# Patient Record
Sex: MALE | Race: WHITE | NOT HISPANIC OR LATINO | Employment: FULL TIME | URBAN - METROPOLITAN AREA
[De-identification: names, ages, dates, MRNs, and addresses within clinical notes are randomized per-mention and may not be internally consistent; named-entity substitution may affect disease eponyms.]

---

## 2018-08-28 ENCOUNTER — TELEPHONE (OUTPATIENT)
Dept: UROLOGY | Facility: CLINIC | Age: 48
End: 2018-08-28

## 2018-11-19 ENCOUNTER — OFFICE VISIT (OUTPATIENT)
Dept: PRIMARY CARE CLINIC | Facility: CLINIC | Age: 48
End: 2018-11-19
Payer: COMMERCIAL

## 2018-11-19 VITALS
HEART RATE: 55 BPM | WEIGHT: 250 LBS | OXYGEN SATURATION: 97 % | HEIGHT: 77 IN | DIASTOLIC BLOOD PRESSURE: 60 MMHG | SYSTOLIC BLOOD PRESSURE: 107 MMHG | BODY MASS INDEX: 29.52 KG/M2 | RESPIRATION RATE: 16 BRPM | TEMPERATURE: 98 F

## 2018-11-19 DIAGNOSIS — F41.9 ANXIETY: ICD-10-CM

## 2018-11-19 DIAGNOSIS — M67.431 GANGLION CYST OF DORSUM OF RIGHT WRIST: ICD-10-CM

## 2018-11-19 DIAGNOSIS — E11.69 TYPE 2 DIABETES MELLITUS WITH HYPERLIPIDEMIA: ICD-10-CM

## 2018-11-19 DIAGNOSIS — Z23 NEED FOR VACCINATION: ICD-10-CM

## 2018-11-19 DIAGNOSIS — G47.33 OSA (OBSTRUCTIVE SLEEP APNEA): ICD-10-CM

## 2018-11-19 DIAGNOSIS — I10 ESSENTIAL HYPERTENSION, BENIGN: ICD-10-CM

## 2018-11-19 DIAGNOSIS — F43.10 PTSD (POST-TRAUMATIC STRESS DISORDER): ICD-10-CM

## 2018-11-19 DIAGNOSIS — Z00.00 ANNUAL PHYSICAL EXAM: Primary | ICD-10-CM

## 2018-11-19 DIAGNOSIS — E78.2 MIXED HYPERLIPIDEMIA: ICD-10-CM

## 2018-11-19 DIAGNOSIS — E78.5 TYPE 2 DIABETES MELLITUS WITH HYPERLIPIDEMIA: ICD-10-CM

## 2018-11-19 DIAGNOSIS — F32.A DEPRESSION, UNSPECIFIED DEPRESSION TYPE: ICD-10-CM

## 2018-11-19 PROCEDURE — 99396 PREV VISIT EST AGE 40-64: CPT | Mod: 25,S$GLB,, | Performed by: FAMILY MEDICINE

## 2018-11-19 PROCEDURE — 90471 IMMUNIZATION ADMIN: CPT | Mod: S$GLB,,, | Performed by: FAMILY MEDICINE

## 2018-11-19 PROCEDURE — 90732 PPSV23 VACC 2 YRS+ SUBQ/IM: CPT | Mod: S$GLB,,, | Performed by: FAMILY MEDICINE

## 2018-11-19 PROCEDURE — 99999 PR PBB SHADOW E&M-EST. PATIENT-LVL III: CPT | Mod: PBBFAC,,, | Performed by: FAMILY MEDICINE

## 2018-11-19 RX ORDER — VENLAFAXINE HYDROCHLORIDE 75 MG/1
75 CAPSULE, EXTENDED RELEASE ORAL DAILY
COMMUNITY

## 2018-11-19 RX ORDER — ATORVASTATIN CALCIUM 80 MG/1
40 TABLET, FILM COATED ORAL DAILY
COMMUNITY

## 2018-11-19 RX ORDER — VENLAFAXINE 75 MG/1
75 TABLET ORAL NIGHTLY
COMMUNITY
End: 2018-11-19 | Stop reason: CLARIF

## 2018-11-19 NOTE — PROGRESS NOTES
Patient ID by name and . NKDA. Pneumonia 23 vaccine given IM in left deltoid using aseptic technique. Aspirated with no blood noted. Patient tolerated well. Given per physicians order. No adverse reaction noted.

## 2018-11-19 NOTE — PROGRESS NOTES
"Subjective:       Patient ID: Jarett Lnage Jr. is a 47 y.o. male.    Chief Complaint: Annual Exam (Patient is fasting for labs )    Here for annual exam, fasting for labs.  Checks his sugar periodically, typically in the low 100s.  No recent hypoglycemia.  Also sees a primary care physician at the [facility]  Sees a mental health provider there, as well.  Recently switched from Wellbutrin to Effexor, seems to be doing much better.      Review of Systems   Constitutional: Negative for chills, fatigue and fever.   HENT: Negative for congestion.    Eyes: Negative for visual disturbance.   Respiratory: Negative for cough and shortness of breath.    Cardiovascular: Negative for chest pain.   Gastrointestinal: Negative for abdominal pain, nausea and vomiting.   Endocrine: Negative for polydipsia and polyuria.   Genitourinary: Negative for difficulty urinating.   Musculoskeletal: Negative for arthralgias.   Skin: Negative for rash.   Allergic/Immunologic: Negative for immunocompromised state.   Neurological: Negative for dizziness.   Hematological: Does not bruise/bleed easily.   Psychiatric/Behavioral: Positive for dysphoric mood and sleep disturbance. Negative for self-injury and suicidal ideas. The patient is nervous/anxious.        Objective:      Vitals:    11/19/18 1619   BP: 107/60   BP Location: Left arm   Patient Position: Sitting   BP Method: Large (Automatic)   Pulse: (!) 55   Resp: 16   Temp: 98.3 °F (36.8 °C)   TempSrc: Oral   SpO2: 97%   Weight: 113.4 kg (250 lb)   Height: 6' 5" (1.956 m)     Physical Exam   Constitutional: He is oriented to person, place, and time. He appears well-developed and well-nourished.   HENT:   Head: Normocephalic and atraumatic.   Mouth/Throat: Oropharynx is clear and moist.   Eyes: EOM are normal. Pupils are equal, round, and reactive to light.   Neck: Neck supple. No JVD present. Carotid bruit is not present.   Cardiovascular: Normal rate, regular rhythm and normal heart " sounds.   Pulses:       Radial pulses are 2+ on the right side, and 2+ on the left side.        Dorsalis pedis pulses are 1+ on the right side, and 1+ on the left side.   Pulmonary/Chest: Effort normal and breath sounds normal.   Abdominal: Soft. Bowel sounds are normal. There is no tenderness.   Musculoskeletal: He exhibits no edema.        Right wrist: He exhibits swelling (1 cm cystic mass to the dorsal surface of the right wrist, nontender).   Feet:   Right Foot:   Protective Sensation: 9 sites tested. 9 sites sensed.   Skin Integrity: Negative for ulcer, blister or skin breakdown.   Left Foot:   Protective Sensation: 9 sites tested. 9 sites sensed.   Skin Integrity: Negative for ulcer, blister or skin breakdown.   Neurological: He is alert and oriented to person, place, and time.   Skin: Skin is warm and dry.   Psychiatric: He has a normal mood and affect. His behavior is normal.   Nursing note and vitals reviewed.      Assessment:       1. Annual physical exam    2. Mixed hyperlipidemia    3. Type 2 diabetes mellitus with hyperlipidemia    4. Essential hypertension, benign    5. PTSD (post-traumatic stress disorder)    6. FREIDA (obstructive sleep apnea)    7. Need for vaccination    8. Ganglion cyst of dorsum of right wrist    9. Anxiety    10. Depression, unspecified depression type        Plan:       Annual physical exam  -     CBC auto differential; Future; Expected date: 11/19/2018  -     Comprehensive metabolic panel; Future; Expected date: 11/19/2018  -     Lipid panel; Future; Expected date: 11/19/2018  -     Hemoglobin A1c; Future; Expected date: 11/19/2018  -     Microalbumin/creatinine urine ratio; Future; Expected date: 11/19/2018  -     TSH; Future; Expected date: 11/19/2018    Mixed hyperlipidemia  -     Comprehensive metabolic panel; Future; Expected date: 11/19/2018  -     Lipid panel; Future; Expected date: 11/19/2018  -     TSH; Future; Expected date: 11/19/2018    Type 2 diabetes mellitus with  hyperlipidemia  -     Comprehensive metabolic panel; Future; Expected date: 11/19/2018  -     Hemoglobin A1c; Future; Expected date: 11/19/2018  -     Microalbumin/creatinine urine ratio; Future; Expected date: 11/19/2018  -     TSH; Future; Expected date: 11/19/2018  -     HM DIABETES FOOT EXAM    Essential hypertension, benign  -     CBC auto differential; Future; Expected date: 11/19/2018  -     Comprehensive metabolic panel; Future; Expected date: 11/19/2018    PTSD (post-traumatic stress disorder)  -     TSH; Future; Expected date: 11/19/2018    FREIDA (obstructive sleep apnea)    Need for vaccination  -     Pneumococcal Polysaccharide Vaccine (23 Valent) (SQ/IM)    Ganglion cyst of dorsum of right wrist  -     Ambulatory Referral to Orthopedics    Anxiety    Depression, unspecified depression type         Medication List           Accurate as of 11/19/18  6:10 PM. If you have any questions, ask your nurse or doctor.               CONTINUE taking these medications    atorvastatin 80 MG tablet  Commonly known as:  LIPITOR     lisinopril 20 MG tablet  Commonly known as:  PRINIVIL,ZESTRIL     LORazepam 2 MG Tab  Commonly known as:  ATIVAN     metFORMIN 1000 MG tablet  Commonly known as:  GLUCOPHAGE     venlafaxine 75 MG 24 hr capsule  Commonly known as:  EFFEXOR-XR        STOP taking these medications    diphenhydrAMINE 25 mg capsule  Commonly known as:  BENADRYL  Stopped by:  Omar Contreras MD     gabapentin 300 MG capsule  Commonly known as:  NEURONTIN  Stopped by:  Omar Contreras MD     mirtazapine 30 MG tablet  Commonly known as:  REMERON  Stopped by:  Omar Contreras MD

## 2018-11-21 ENCOUNTER — TELEPHONE (OUTPATIENT)
Dept: PRIMARY CARE CLINIC | Facility: CLINIC | Age: 48
End: 2018-11-21

## 2018-11-21 DIAGNOSIS — E87.6 HYPOKALEMIA: Primary | ICD-10-CM

## 2018-11-21 NOTE — TELEPHONE ENCOUNTER
Spoke w/ patient. Patient notified of results. Advised patient to continue current medications and add OTC Fish Oil 2,000mg daily, increase in-take of potassium rich foods with examples, and scheduled for repeat BMP and EKG. Patient verbalized understanding and call was ended.             ----- Message from Zoraida Dee sent at 11/21/2018 11:30 AM CST -----  Contact: self  Type:  Patient Returning Call    Who Called:  sefl  Who Left Message for Patient:  Sudhir  Does the patient know what this is regarding?:  no  Best Call Back Number:  [phone]  Additional Information:  Patient missed call. Please call back. Thanks!

## 2018-12-11 ENCOUNTER — OFFICE VISIT (OUTPATIENT)
Dept: PRIMARY CARE CLINIC | Facility: CLINIC | Age: 48
End: 2018-12-11
Payer: COMMERCIAL

## 2018-12-11 VITALS
SYSTOLIC BLOOD PRESSURE: 112 MMHG | HEART RATE: 72 BPM | RESPIRATION RATE: 16 BRPM | BODY MASS INDEX: 29.28 KG/M2 | HEIGHT: 77 IN | WEIGHT: 248 LBS | DIASTOLIC BLOOD PRESSURE: 66 MMHG | OXYGEN SATURATION: 99 % | TEMPERATURE: 98 F

## 2018-12-11 DIAGNOSIS — E78.2 MIXED HYPERLIPIDEMIA: Primary | ICD-10-CM

## 2018-12-11 DIAGNOSIS — E78.5 TYPE 2 DIABETES MELLITUS WITH HYPERLIPIDEMIA: ICD-10-CM

## 2018-12-11 DIAGNOSIS — E87.6 HYPOKALEMIA: ICD-10-CM

## 2018-12-11 DIAGNOSIS — E11.69 TYPE 2 DIABETES MELLITUS WITH HYPERLIPIDEMIA: ICD-10-CM

## 2018-12-11 PROCEDURE — 93005 ELECTROCARDIOGRAM TRACING: CPT | Mod: S$GLB,,, | Performed by: FAMILY MEDICINE

## 2018-12-11 PROCEDURE — 99999 PR PBB SHADOW E&M-EST. PATIENT-LVL III: CPT | Mod: PBBFAC,,, | Performed by: FAMILY MEDICINE

## 2018-12-11 PROCEDURE — 3008F BODY MASS INDEX DOCD: CPT | Mod: CPTII,S$GLB,, | Performed by: FAMILY MEDICINE

## 2018-12-11 PROCEDURE — 99214 OFFICE O/P EST MOD 30 MIN: CPT | Mod: S$GLB,,, | Performed by: FAMILY MEDICINE

## 2018-12-11 PROCEDURE — 93010 ELECTROCARDIOGRAM REPORT: CPT | Mod: S$GLB,,, | Performed by: INTERNAL MEDICINE

## 2018-12-11 PROCEDURE — 3044F HG A1C LEVEL LT 7.0%: CPT | Mod: CPTII,S$GLB,, | Performed by: FAMILY MEDICINE

## 2018-12-11 NOTE — PROGRESS NOTES
"Subjective:       Patient ID: Jarett Lange Jr. is a 48 y.o. male.    Chief Complaint: Follow-up (Patient is here to review lab results )    Diabetes mellitus - a1c 5.7, compliant with meds, no hypoglycemia  HLD - TG >300, cholesterol level looks good. Recently started taking OTC fish oil, tolerating well  HTN - compliant with meds, BP well controlled  K low on recent labs, says it has been low in the past. No recent illness, no N/V/D        Review of Systems   Constitutional: Negative for fever.   Eyes: Negative for visual disturbance.   Respiratory: Negative for shortness of breath.    Cardiovascular: Negative for chest pain.   Gastrointestinal: Negative for diarrhea, nausea and vomiting.   Genitourinary: Negative for difficulty urinating.   Skin: Negative for rash.   Psychiatric/Behavioral: Negative for agitation. The patient is nervous/anxious.        Objective:      Vitals:    12/11/18 1334   BP: 112/66   BP Location: Left arm   Patient Position: Sitting   BP Method: Large (Automatic)   Pulse: 72   Resp: 16   Temp: 98.1 °F (36.7 °C)   TempSrc: Oral   SpO2: 99%   Weight: 112.5 kg (248 lb)   Height: 6' 5" (1.956 m)     Lab Results   Component Value Date    WBC 8.20 11/19/2018    HGB 13.7 (L) 11/19/2018    HCT 39.8 (L) 11/19/2018     11/19/2018    CHOL 144 11/19/2018    TRIG 326 (H) 11/19/2018    HDL 44 11/19/2018    ALT 27 11/19/2018    AST 23 11/19/2018     11/19/2018    K 3.2 (L) 11/19/2018     (L) 11/19/2018    CREATININE 1.0 11/19/2018    BUN 8 11/19/2018    CO2 30 (H) 11/19/2018    TSH 1.47 11/19/2018    HGBA1C 5.7 (H) 11/19/2018     Physical Exam   Constitutional: He is oriented to person, place, and time. He appears well-developed and well-nourished.   HENT:   Head: Normocephalic and atraumatic.   Neck: No JVD present.   Cardiovascular: Normal rate, regular rhythm and normal heart sounds.   Pulmonary/Chest: Effort normal and breath sounds normal.   Musculoskeletal: He exhibits no " edema.   Neurological: He is alert and oriented to person, place, and time.   Skin: Skin is warm and dry.   Psychiatric: He has a normal mood and affect. His behavior is normal.   Nursing note and vitals reviewed.      Assessment:       1. Mixed hyperlipidemia    2. Type 2 diabetes mellitus with hyperlipidemia    3. Hypokalemia        Plan:       Mixed hyperlipidemia  -     Comprehensive metabolic panel; Future; Expected date: 03/11/2019  -     Lipid panel; Future; Expected date: 03/11/2019  Continue current regimen (atorvastatin and fish oil). Repeat fasting lipid profile in 3 months. If still elevated, switch to rosuvastatin.  Type 2 diabetes mellitus with hyperlipidemia  Well controlled, continue current meds  Hypokalemia  -     EKG 12-lead  No acute EKG changes.  BMP today       Medication List           Accurate as of 12/11/18  2:37 PM. If you have any questions, ask your nurse or doctor.               CONTINUE taking these medications    atorvastatin 80 MG tablet  Commonly known as:  LIPITOR     FISH OIL ORAL     lisinopril 20 MG tablet  Commonly known as:  PRINIVIL,ZESTRIL     LORazepam 2 MG Tab  Commonly known as:  ATIVAN     metFORMIN 1000 MG tablet  Commonly known as:  GLUCOPHAGE     venlafaxine 75 MG 24 hr capsule  Commonly known as:  EFFEXOR-XR

## 2019-10-31 DIAGNOSIS — E11.9 TYPE 2 DIABETES MELLITUS WITHOUT COMPLICATION: ICD-10-CM

## 2020-05-21 DIAGNOSIS — E11.69 TYPE 2 DIABETES MELLITUS WITH HYPERLIPIDEMIA: ICD-10-CM

## 2020-05-21 DIAGNOSIS — E78.5 TYPE 2 DIABETES MELLITUS WITH HYPERLIPIDEMIA: ICD-10-CM

## 2025-08-01 ENCOUNTER — OFFICE VISIT (OUTPATIENT)
Dept: OTOLARYNGOLOGY | Facility: CLINIC | Age: 55
End: 2025-08-01
Payer: COMMERCIAL

## 2025-08-01 VITALS
SYSTOLIC BLOOD PRESSURE: 156 MMHG | HEART RATE: 48 BPM | BODY MASS INDEX: 29.6 KG/M2 | HEIGHT: 77 IN | DIASTOLIC BLOOD PRESSURE: 83 MMHG | WEIGHT: 250.69 LBS

## 2025-08-01 DIAGNOSIS — H74.12 ADHESIVE MIDDLE EAR DISEASE WITH ADHESIONS OF DRUM HEAD TO INCUS, LEFT: ICD-10-CM

## 2025-08-01 DIAGNOSIS — H69.93 CHRONIC EUSTACHIAN TUBE DYSFUNCTION, BILATERAL: ICD-10-CM

## 2025-08-01 DIAGNOSIS — H81.20 ACUTE PERIPHERAL VESTIBULOPATHY, UNSPECIFIED LATERALITY: Primary | ICD-10-CM

## 2025-08-01 DIAGNOSIS — H61.22 IMPACTED CERUMEN OF LEFT EAR: ICD-10-CM

## 2025-08-01 DIAGNOSIS — R42 VERTIGO: ICD-10-CM

## 2025-08-01 PROCEDURE — 99999 PR PBB SHADOW E&M-EST. PATIENT-LVL V: CPT | Mod: PBBFAC,,, | Performed by: OTOLARYNGOLOGY

## 2025-08-01 RX ORDER — FLUOXETINE 20 MG/1
60 CAPSULE ORAL DAILY
COMMUNITY
Start: 2025-07-23

## 2025-08-01 RX ORDER — LORAZEPAM 1 MG/1
1 TABLET ORAL DAILY PRN
COMMUNITY
Start: 2025-07-23

## 2025-08-01 RX ORDER — LISINOPRIL 40 MG/1
40 TABLET ORAL
COMMUNITY
Start: 2025-01-07

## 2025-08-01 NOTE — PATIENT INSTRUCTIONS
Vestibular rehabilitation therapy as ordered.  Home exercises until directed other home exercises by a vestibular trained therapist.  VNG testing and audiogram at [facility].  No meclizine or any other sedating medications for least 24 hours prior to this test.  Follow up here in [place] after completion of testing.      As discussed if there is any worsening of symptoms or new symptoms emergent evaluation in the ER is appropriate.    Voice recognition software was used in the creation of this note/communication and any sound-alike errors which may have occurred from its use should be taken in context when interpreting.  If such errors prevent a clear understanding of the note/communication, please contact the office for clarification.      Cawthorne-Cooksey exercises    Instructions for patients    The balance parts of the two ears complement each other by sending equal   impulses to the brain which are essential for the maintenance of equilibrium of the head and body.    If either or both balance centres are damaged, equilibrium is upset. The result of this is vertigo or giddiness, which may be accompanied by nausea and vomiting.     Although this condition may be very frightening it is not serious, in that it does not, in itself, threaten life. It can, in many cases, be overcome by carrying out these special exercises.    The purpose of these exercises is to build up a tolerance mechanism and the more diligently and regularly they are carried out, the sooner the symptoms will disappear.    The exercises should be performed three times each day.    Begin with exercise 1A. This should be performed in three sets of five, three times a day. Grade the severity of your symptoms as you do this exercise, using the following scale:  0 symptom free  1 mild discomfort  2 discomfort  3 severe    Only when the symptoms clear, or after two weeks, move on to the next exercise     1B.  A conscious effort should be  made to seek out the head positions and movement that cause vertigo as far as can be tolerated, because the more frequently vertigo is induced the more quickly the brain compensation  mechanism builds up.    You may have been prescribed medication to ease your symptoms and should continue with these while on this exercise program.    Exercises    A In bed  1 Eye movements: at first slow, then quick  a) up and down  b) from side to side  c) focus on finger moving from 3ft to 1ft away from face  2 Head movements: at first slow, then quick. Later with eyes closed.  a) bend backwards and forwards  b) turn from side to side    B Sitting  1 and 2 - as above  3 Shoulder shrugging and circling  4 Bend forward and  objects from the ground  A Standing  1 As A1, A2 and B3  2 Change from sitting to standing with eyes open, then eyes closed  3 Throw a small ball from hand to hand (above eye level)  4 Throw a ball from hand to hand under the knee  5 Change from sitting to standing and turn round in between

## 2025-08-01 NOTE — PROCEDURES
EAR DEBRIDEMENT / CERUMEN DISIMPACTION, 81915     Indication: Excessive cerumen with symptoms, limiting complete ear exam    Side: bilateral    Findings: See physical exam    Procedure: Ear canal(s) cleared completely using suction with microscopy.  Wax was not hydrolyzed with peroxide.  Topical medication was not applied.    Complications: none

## 2025-08-01 NOTE — PROGRESS NOTES
[facility]    Subjective:      Patient: Jarett Lange Jr. Patient PCP: Romario Tan MD         :  1970     Sex:  male      MRN:  [id number]          Date of Visit: 2025      Chief Complaint: Dizziness      Patient ID: Jarett Lange Jr. is a 54 y.o. male     Patient is a lifelong NON-smoker with a significant past medical history of type 2 diabetes with an A1c of 5.9%, FREIDA, HLD, HTN, anxiety/depression/PTSD referred to me by Dr. Liss Garcia in consultation for dizziness.    Patient was on a cruise only  at no rest sees or any mal-de-debarquement symptoms after.  He has anxiety and PTSD which are well-controlled and he has had no increase in his sense of anxiety.  He is in his normal state of health when after having breakfast while at work he began feeling more and more dizzy.  They became rather intense feeling a sensation of movement without any associated ear fullness, roaring, hissing, fullness or hearing loss and went home to rest.  He felt better the next day by mid day he was feeling a new onset of the symptoms again without associated ear symptoms and no localizing neurologic symptoms.  Due to his level of concern which is appropriate [facility].  Told that he might have vertigo.  CTA due to implanted nerve stimulator with no concerning findings (see below).  He has been on meclizine sense in his felt much better.  During the post attack.  He definitely felt a sense of imbalance like he was drifting to the left.  No exacerbation by lying down or looking up.  Can not identify any trigger.      No audiogram.  No VNG    No MRI.  At CTA head and neck 2025 at [facility] with no  neurologic imaging for review.  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  No suspicious masses or vascular  abnormalities otherwise identified.    Labs:  WBC   Date Value Ref Range Status   04/12/2019 7.10 3.90 - 12.70 K/uL Final     Hemoglobin   Date Value Ref Range Status   04/12/2019 13.9 (L) 14.0 - 18.0 g/dL Final     Platelets   Date Value Ref Range Status   04/12/2019 333 150 - 350 K/uL Final     Creatinine   Date Value Ref Range Status   04/12/2019 0.9 0.5 - 1.4 mg/dL Final     TSH   Date Value Ref Range Status   04/12/2019 1.24 0.45 - 5.33 uIU/mL Final     Hemoglobin A1C   Date Value Ref Range Status   04/12/2019 5.9 (H) 4.0 - 5.6 % Final     Comment:     ADA Screening Guidelines:  5.7-6.4%  Consistent with prediabetes  >or=6.5%  Consistent with diabetes  High levels of fetal hemoglobin interfere with the HbA1C  assay. Heterozygous hemoglobin variants (HbS, HgC, etc)do  not significantly interfere with this assay.   However, presence of multiple variants may affect accuracy.         Past Medical History  He has a past medical history of Anxiety, Diabetes mellitus, Diabetes mellitus, type 2, Hyperlipidemia, Hypertension, Neuromuscular disorder, and Sleep apnea.    Family / Surgical / Social History  His family history includes COPD in his mother; Cancer in his mother; Heart disease in his father; Hyperlipidemia in his maternal grandmother and mother; Hypertension in his maternal grandmother and mother.    Past Surgical History:   Procedure Laterality Date    EPIDURAL STEROID INJECTION      ULNAR TUNNEL RELEASE         Social History     Tobacco Use    Smoking status: Never    Smokeless tobacco: Never   Substance and Sexual Activity    Alcohol use: No    Drug use: No    Sexual activity: Yes     Partners: Female       Medications  He has a current medication list which includes the following prescription(s): atorvastatin, fluoxetine, lisinopril, lorazepam, meclizine, metformin, diclofenac, docosahexaenoic acid/epa, mupirocin, ondansetron, and tramadol.      Allergies  Review of patient's allergies indicates:  No  "Known Allergies    All medications, allergies, and past history have been reviewed.    Objective:      Vitals:      6/7/2019    12:40 PM 7/28/2025     4:34 PM 8/1/2025     1:07 PM   Vitals - 1 value per visit   SYSTOLIC 119 176 156   DIASTOLIC 78 81 83   Pulse 61 50 48   Temp 98.4 °F (36.9 °C) 97.9 °F (36.6 °C)    Resp 18 20    SPO2 100 % 95 %    Weight (lb) 257.8 249.34 250.66   Weight (kg) 116.937 113.1 113.7   Height 6' 5" (1.956 m)  6' 5" (1.956 m)   BMI (Calculated) 30.6  29.7   Pain Score Zero  Zero       Body surface area is 2.49 meters squared.    Physical Exam:    GENERAL  APPEARANCE -  alert, appears stated age, and cooperative  BARRIER(S) TO COMMUNICATION -  none VOICE - appropriate for age and gender    INTEGUMENTARY  no suspicious head and neck lesions    HEENT  HEAD: Normocephalic, without obvious abnormality, atraumatic  FACE: INSPECTION - Symmetric, no signs of trauma, no suspicious lesion(s)      STRENGTH - facial symmetry intact     PALPATION -  No masses     SALIVARY GLANDS - non-tender with no appreciable mass    NECK/THYROID: normal atraumatic, no neck masses, normal thyroid, no jvd    EYES  Normal occular alignment and mobility with no visible nystagmus at rest    EARS/NOSE/MOUTH/THROAT  EARS  PINNAE AND EXTERNAL EARS - no suspicious lesion OTOSCOPIC EXAM (surgical microscopy was used for visualization/instrumentation): EAR EXAM - heavy wax on the right and obstructive impacted wax in the left cleared with suctioned and Micro without touch short trauma.  Tolerated well to reveal some retraction of both tympanic membranes with some contact versus adhesion of the tympanic membrane to what might be a partially eroded distal incus verses stapes capitulum.  No effusion or cholesteatoma.  HEARING - grossly intact to voice/finger rub    NOSE AND SINUSES  EXTERNAL NOSE - Grossly normal for age/sex  SEPTUM - normal/no obstruction on anterior exam without decongestion TURBINATES - within normal limits " MUCOSA - within normal limits     MOUTH AND THROAT   ORAL CAVITY, LIPS, TEETH, GUMS & TONGUE - moist, no suspicious lesions  OROPHARYNX /TONSILS/PHARYNGEAL WALLS/HYPOPHARYNX - no erythema or exudates  NASOPHARYNX - limited mirror exam - unable to visualize due to anatomy/gag  LARYNX -  - limited mirror exam - unable to visualize due to anatomy/gag      CHEST AND LUNG   INSPECTION & AUSCULTATION - normal effort, no stridor    CARDIOVASCULAR  AUSCULTATION & PERIPHERAL VASCULAR - regular rate and rhythm.    NEUROLOGIC  MENTAL STATUS - alert, interactive CRANIAL NERVES - normal   HEAD THRUSTS - Catch up saccades absent bilaterally   FUKUDA - mild turn right  RHOMBERG - Tandem (No) - normal  GAIT - normal w/o drift and normal turn bilaterally      LYMPHATIC  HEAD AND NECK - non-palpable; SUPRACLAVICULAR - deferred      Procedure(s):  None          Assessment:      Problem List Items Addressed This Visit    None  Visit Diagnoses         Acute peripheral vestibulopathy, unspecified laterality    -  Primary      Vertigo          Impacted cerumen of left ear          Chronic Eustachian tube dysfunction, bilateral          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  There was also significant findings of possibly adhesive middle ear disease and chronic Eustachian tube dysfunction which may indicate 3rd window disease or other causes of peripheral vertigo.  CTA is reassuring but not a substitute for brainstem MRI should central symptoms evolve or be favored on VNG testing.  Cawthorne Cooksey exercises and referral to V RT.  Follow up after audiogram and VNG.  See patient instructions.    Patient/family understands the diagnosis and evaluation and treatment plan after discussing at length. All questions answered         Voice recognition software was used in the creation of  this note/communication and any sound-alike errors which may have occurred from its use should be taken in context when interpreting.  If such errors prevent a clear understanding of the note/communication, please contact the office for clarification.

## 2025-08-05 ENCOUNTER — TELEPHONE (OUTPATIENT)
Dept: OTOLARYNGOLOGY | Facility: CLINIC | Age: 55
End: 2025-08-05
Payer: COMMERCIAL